# Patient Record
(demographics unavailable — no encounter records)

---

## 2025-02-07 NOTE — CONSULT LETTER
[Dear  ___] : Dear  [unfilled], [Consult Letter:] : I had the pleasure of evaluating your patient, [unfilled]. [Please see my note below.] : Please see my note below. [Consult Closing:] : Thank you very much for allowing me to participate in the care of this patient.  If you have any questions, please do not hesitate to contact me. [Sincerely,] : Sincerely, [FreeTextEntry3] : Linnea Baker MD - [facility]ermat[facility]

## 2025-02-07 NOTE — HISTORY OF PRESENT ILLNESS
[FreeTextEntry1] : NPV: rash [de-identified] : Ms. EVA LOPEZ is a 3 year old F is presenting for evaluation of:  Accompanied by: grandfather and grandmother   1. Itchy rash x years: flares in winter  - onset:  first noted shortly - progression: worse in winter - prior treatments:  cortisone, triamcinolone 0.1%, mometasone (rxed by [facility] in [place]), mupirocin 2% - symptoms:  itchy rash on legs; over body   Products using include: Vaseline, Oatmeal bath, Humidifier, flares when sick,   Family history:  Father with eczema

## 2025-02-07 NOTE — PHYSICAL EXAM
[Full Body Skin Exam Performed] : performed [FreeTextEntry3] : - Rough, poorly demarcated red plaques on the lower legs b/l  - Many scattered red papules on the trunk, upper legs, upper extremities, groin/buttocks - Face clear

## 2025-02-07 NOTE — CONSULT LETTER
[Dear  ___] : Dear  [unfilled], [Consult Letter:] : I had the pleasure of evaluating your patient, [unfilled]. [Please see my note below.] : Please see my note below. [Consult Closing:] : Thank you very much for allowing me to participate in the care of this patient.  If you have any questions, please do not hesitate to contact me. [Sincerely,] : Sincerely, [FreeTextEntry3] : Linnea Baker MD - [facility] Pediatric [facility]ermat[facility]

## 2025-02-07 NOTE — HISTORY OF PRESENT ILLNESS
[FreeTextEntry1] : NPV: rash [de-identified] : Ms. EVA LOPEZ is a 3 year old F is presenting for evaluation of:  Accompanied by: grandfather and grandmother   1. Itchy rash x years: flares in winter  - onset:  first noted shortly - progression: worse in winter - prior treatments:  cortisone, triamcinolone 0.1%, mometasone (rxed by [facility] in [place]), mupirocin 2% - symptoms:  itchy rash on legs; over body   Products using include: Vaseline, Oatmeal bath, Humidifier, flares when sick,   Family history:  Father with eczema

## 2025-02-07 NOTE — ASSESSMENT
[Use of independent historian: [ enter independent historian's relationship to patient ] :____] : As the patient was unable to provide a complete and reliable history, I obtained clinical history from the patient's [unfilled] [FreeTextEntry1] : #Dermatitis, new diagnosis of uncertain prognosis Favor #Miliaria rubra, flaring -- likely in the setting of patient's room temperature set to 76F+ with humidifier; patient also uncomfortable with level of heat in her room chronic, stable - Discussed the likely nature and anticipated natural course of these benign lesions - Reassurance - Recommended avoidance of excessive heat or heater exposure given morphology - If not improving, consider biopsy at follow up visit as rash on trunk and upper extremities not c/w eczema  #Eczema, chronic, flaring - lower legs b/l - Orientation provided about nature of condition, treatment expectations, alternatives, risks and benefits FOR BODY: - May continue to use Triamcinolone or Mometasone as prescribed by outside dermatologist PRN roughness to body, not for face, groin or axillae; SED FOR MAINTENENCE: - START tacrolimus 0.1% ointment BID to affected areas - SED; patient counseled to apply with thin layer of bland unscented Vaseline for first few days if endorsing discomfort with tacrolimus - Dry/gentle skin care reviewed. Advised switching to Vanicream brand products. START OTC CLN BODY WASH.  - Recommended avoid products with fragrance; if applicable, including wipes, eye makeup.   #Xerosis Cutis - Gentle skin care reviewed. Short baths/showers every other day, moisturize within three minutes of getting out of the shower, lukewarm water, liberal use of emollients at least 2-3X/day Cetaphil or Cerave cream BID to TID. Dove non scented soap in showers.   -Provided hand out on dry skin care and recommended gentle moisturizing creams   -Recommended limiting baths and showers to 5min and using warm not hot water, patting dry with towel without rubbing, and moisturizing immediately afterwards  Dry skin care reviewed:   - Take short showers/baths (avoid hot water)   - Use a mild soap (eg. CeraVe cleanser or Aquaphor)   - Use soap only on areas truly needed (underarms,groin,buttocks,fold areas, feet, face, hair)   - Pat off excess water and put moisturizer on immediately (within 3 min.)               Good moisturizing choices include:                        1. Cetaphil cream (not baby Cetaphil)                        2. CeraVe cream                        3. Vanicream cream                        4. Aquaphor ointment                        5. Vaseline ointment                        6. CeraVe ointment   - A moisturizer should always be applied after showering or bathing, but may be applied as many additional times as is necessary.  Patient may follow up in 4 weeks, or sooner PRN if any changes, new or growing lesions

## 2025-06-27 NOTE — HISTORY OF PRESENT ILLNESS
[FreeTextEntry1] : RPV eczema FU  [de-identified] : Ms. EVA LOPEZ is a 4 year old F is presenting for evaluation of:  Accompanied by: father  LV Feb 2025 w/ eczema. At the time had been using mometasone from outside derm but soon after ran out of medication. Since LV has been using tacrolimus intermittently to AA and taking children's zyrtec intermittently. Parents feel that zyrtec dries her skin. Also report vanicream soap caused worsening skin rash. The have been using CLN was 2x/week, otherwise no soap. Reports itching behind ears.   Products using include: Vaseline, Oatmeal bath, Humidifier, flares when sick,   Family history:  Father with eczema Allergies: Treenuts

## 2025-06-27 NOTE — PHYSICAL EXAM
[Full Body Skin Exam Performed] : performed [Alert] : alert [Oriented x 3] : ~L oriented x 3 [Well Nourished] : well nourished [Conjunctiva Non-injected] : conjunctiva non-injected [No Visual Lymphadenopathy] : no visual  lymphadenopathy [No Clubbing] : no clubbing [No Edema] : no edema [No Bromhidrosis] : no bromhidrosis [No Chromhidrosis] : no chromhidrosis [FreeTextEntry3] : - Rough, poorly demarcated red plaques on the lower legs b/l  - Many scattered red papules on the trunk, upper legs, upper extremities - bl eyelid erythema and edema

## 2025-06-27 NOTE — CONSULT LETTER
[Dear  ___] : Dear  [unfilled], [Consult Letter:] : I had the pleasure of evaluating your patient, [unfilled]. [Please see my note below.] : Please see my note below. [Consult Closing:] : Thank you very much for allowing me to participate in the care of this patient.  If you have any questions, please do not hesitate to contact me. [Sincerely,] : Sincerely, [FreeTextEntry3] : Linnea Baker MD - [facility] Pediatric Dermatology

## 2025-06-27 NOTE — ASSESSMENT
[Use of independent historian: [ enter independent historian's relationship to patient ] :____] : As the patient was unable to provide a complete and reliable history, I obtained clinical history from the patient's [unfilled] [FreeTextEntry1] : #Eczema, chronic, flaring - lower legs b/l, trunk  - Orientation provided about nature of condition, treatment expectations, alternatives, risks and benefits FOR BODY: - RESTART Mometasone PRN roughness to body, not for face, groin or axillae; SED FOR FACE/EARS, and maintenance: - RESTART tacrolimus 0.1% ointment BID to affected areas. SED - Dry/gentle skin care reviewed. Advised switching to Vanicream brand products. START OTC CLN BODY WASH.  - Recommended avoid products with fragrance; if applicable, including wipes, eye makeup.   #Xerosis Cutis - Gentle skin care reviewed. Short baths/showers every other day, moisturize within three minutes of getting out of the shower, lukewarm water, liberal use of emollients at least 2-3X/day Cetaphil or Cerave cream BID to TID. Dove non scented soap in showers.   -Provided hand out on dry skin care and recommended gentle moisturizing creams   -Recommended limiting baths and showers to 5min and using warm not hot water, patting dry with towel without rubbing, and moisturizing immediately afterwards  Dry skin care reviewed:   - Take short showers/baths (avoid hot water)   - Use a mild soap (eg. CeraVe cleanser or Aquaphor)   - Use soap only on areas truly needed (underarms,groin,buttocks,fold areas, feet, face, hair)   - Pat off excess water and put moisturizer on immediately (within 3 min.)               Good moisturizing choices include:                        1. Cetaphil cream (not baby Cetaphil)                        2. CeraVe cream                        3. Vanicream cream                        4. Aquaphor ointment                        5. Vaseline ointment                        6. CeraVe ointment   - A moisturizer should always be applied after showering or bathing, but may be applied as many additional times as is necessary.  RTC 2 months

## 2025-06-27 NOTE — CONSULT LETTER
[Dear  ___] : Dear  [unfilled], [Consult Letter:] : I had the pleasure of evaluating your patient, [unfilled]. [Please see my note below.] : Please see my note below. [Consult Closing:] : Thank you very much for allowing me to participate in the care of this patient.  If you have any questions, please do not hesitate to contact me. [Sincerely,] : Sincerely, [FreeTextEntry3] : Linnea Baker MD - [facility] Pediatric Dermatology